# Patient Record
Sex: MALE | Race: WHITE | NOT HISPANIC OR LATINO | Employment: FULL TIME | URBAN - METROPOLITAN AREA
[De-identification: names, ages, dates, MRNs, and addresses within clinical notes are randomized per-mention and may not be internally consistent; named-entity substitution may affect disease eponyms.]

---

## 2023-12-01 ENCOUNTER — OFFICE VISIT (OUTPATIENT)
Dept: FAMILY MEDICINE | Facility: OTHER | Age: 18
End: 2023-12-01
Payer: COMMERCIAL

## 2023-12-01 VITALS
BODY MASS INDEX: 31.71 KG/M2 | TEMPERATURE: 97.6 F | OXYGEN SATURATION: 98 % | DIASTOLIC BLOOD PRESSURE: 86 MMHG | RESPIRATION RATE: 16 BRPM | HEIGHT: 75 IN | SYSTOLIC BLOOD PRESSURE: 130 MMHG | HEART RATE: 91 BPM | WEIGHT: 255 LBS

## 2023-12-01 DIAGNOSIS — L05.91 PILONIDAL CYST: Primary | ICD-10-CM

## 2023-12-01 PROCEDURE — 99213 OFFICE O/P EST LOW 20 MIN: CPT | Performed by: STUDENT IN AN ORGANIZED HEALTH CARE EDUCATION/TRAINING PROGRAM

## 2023-12-01 ASSESSMENT — PAIN SCALES - GENERAL: PAINLEVEL: NO PAIN (0)

## 2023-12-01 NOTE — PROGRESS NOTES
Assessment & Plan     Pilonidal cyst  Does appear to be resolved/resolving.  Manual pressure today does elicit very small amount of serous fluid, no purulence.  Small opening approximately 1 to 2 mm.  No antibiotics required at this point in time, no incision and drainage required as well.  Overall has spontaneously drained.  Preventative measures and worrisome signs and symptoms discussed with the patient.  His father had many issues with this in his past and required surgical fixation, may consider this in the future if continues to be an issue.    GRADY NEWMAN MD  [facility]RUY Lopez is a 18 year old, presenting for the following health issues:  Derm Problem        12/1/2023     7:52 AM   Additional Questions   Roomed by yanique   Accompanied by alone         12/1/2023     7:52 AM   Patient Reported Additional Medications   Patient reports taking the following new medications buproprion, vitamin d       History of Present Illness       Reason for visit:  Pilanidal cyst  Symptom onset:  3-7 days ago    He eats 0-1 servings of fruits and vegetables daily.He consumes 2 sweetened beverage(s) daily.He exercises with enough effort to increase his heart rate 10 to 19 minutes per day.  He exercises with enough effort to increase his heart rate 3 or less days per week. He is missing 2 dose(s) of medications per week.     Patient reports that 5-6 days ago he developed a pilonidal cyst. He denies that the pain is constant, but he relates that this could be due to his higher pain tolerance. He does report that his father has had pilonidal cysts before which required marsupialization. He states that the cyst has ruptured already, but has been re-accumulating fluid every 3-4 days. He is wanting to have it evaluated that it is healing properly or if he needs further surgical debridement.     Review of Systems   Constitutional, HEENT, cardiovascular, pulmonary, gi and gu systems  "are negative, except as otherwise noted.      Objective    /86   Pulse 91   Temp 97.6  F (36.4  C) (Temporal)   Resp 16   Ht 1.9 m (6' 2.8\")   Wt 115.7 kg (255 lb)   SpO2 98%   BMI 32.04 kg/m    Body mass index is 32.04 kg/m .  Physical Exam  Constitutional:       Appearance: Normal appearance. He is obese.   HENT:      Head: Normocephalic and atraumatic.      Nose: Nose normal.   Eyes:      Pupils: Pupils are equal, round, and reactive to light.   Cardiovascular:      Rate and Rhythm: Normal rate and regular rhythm.   Pulmonary:      Effort: Pulmonary effort is normal.   Musculoskeletal:      Cervical back: Normal range of motion and neck supple.   Skin:     General: Skin is warm.      Comments: Small 1 to 2 mm intergluteal cleft opening from previous spontaneously drained pilonidal cyst, small trace amounts of serous fluid expressed, no purulence, no erythema, not warm to touch.   Neurological:      Mental Status: He is alert and oriented to person, place, and time.                      "

## 2023-12-09 ENCOUNTER — HEALTH MAINTENANCE LETTER (OUTPATIENT)
Age: 18
End: 2023-12-09

## 2025-01-11 ENCOUNTER — HEALTH MAINTENANCE LETTER (OUTPATIENT)
Age: 20
End: 2025-01-11